# Patient Record
Sex: MALE | Race: WHITE | NOT HISPANIC OR LATINO
[De-identification: names, ages, dates, MRNs, and addresses within clinical notes are randomized per-mention and may not be internally consistent; named-entity substitution may affect disease eponyms.]

---

## 2022-10-24 PROBLEM — Z00.00 ENCOUNTER FOR PREVENTIVE HEALTH EXAMINATION: Status: ACTIVE | Noted: 2022-10-24

## 2022-11-29 ENCOUNTER — APPOINTMENT (OUTPATIENT)
Dept: ORTHOPEDIC SURGERY | Facility: CLINIC | Age: 42
End: 2022-11-29

## 2022-11-29 VITALS — BODY MASS INDEX: 26.79 KG/M2 | HEIGHT: 76 IN | WEIGHT: 220 LBS

## 2022-11-29 DIAGNOSIS — Z78.9 OTHER SPECIFIED HEALTH STATUS: ICD-10-CM

## 2022-11-29 PROCEDURE — 73564 X-RAY EXAM KNEE 4 OR MORE: CPT | Mod: LT

## 2022-11-29 PROCEDURE — 99204 OFFICE O/P NEW MOD 45 MIN: CPT | Mod: 25

## 2022-11-29 PROCEDURE — J3490M: CUSTOM

## 2022-11-29 PROCEDURE — 20610 DRAIN/INJ JOINT/BURSA W/O US: CPT

## 2022-11-29 NOTE — PHYSICAL EXAM
[Left] : left knee [NL (140)] : flexion 140 degrees [NL (0)] : extension 0 degrees [5___] : hamstring 5[unfilled]/5 [] : minimal effusion

## 2022-11-29 NOTE — PROCEDURE
[de-identified] : Procedure Name: Large Joint Injection / Aspiration: Depomedrol and Marcaine\par Anesthesia: ethyl chloride sprayed topically.. \par Depomedrol: An injection of Depomedrol 80 mg , 1 cc. \par Marcaine: 5 cc. \par Medication was injected in the left knee. Patient has tried OTC's including aspirin, Ibuprofen, Aleve etc or prescription\par NSAIDS, and/or exercises at home and/ or physical therapy without satisfactory response. After verbal consent using sterile preparation and technique. The risks, benefits, and alternatives to cortisone injection were explained in full to the patient. Risks outlined include but are not limited to infection, sepsis, bleeding, scarring, skin discoloration, temporary  increase in pain, syncopal episode, failure to resolve symptoms, allergic reaction, symptom recurrence, and elevation of blood sugar in diabetics. Patient understood the risks. All questions were answered. After discussion of options, patient requested an injection. Oral informed consent was obtained and sterile prep was done of the injection\par site. Sterile technique was utilized for the procedure including the preparation of the solutions used for the injection. Patient tolerated the procedure well. Advised to ice the injection site this evening. Prep with alcohol locally to site. Sterile technique used. Post Procedure Instructions: Patient was advised to call if redness, pain, or fever occur and\par apply ice for 15 min. out of every hour for the next 12-24 hours as tolerated.

## 2022-11-29 NOTE — ASSESSMENT
[FreeTextEntry1] : 42 year M WITH MODERATE LT KNEE PAIN FOR YEARS WITHOUT INJURY. HAS HAD CSI YEARS AGO. HAS TRIED PT WITH NO RELIEF. PAIN WORSENS WITH STAIRS AND WALKING PROLONGED DISTANCES. PAIN IS AFFECTING ADL AND FUNCTIONAL ACTIVITIES. XRAYS REVIEWED WITH ADVANCED LATERAL OA. TREATMENT OPTIONS REVIEWED. \par \par LT KNEE CSI TODAY. PATIENT TOLERATED INJECTION WELL. \par WILL AUTH FOR LT KNEE EUFLEXXA. THIS INJECTION IS MEDICALLY NECESSARY DUE TO SEVERE PAIN AND OA.

## 2022-11-29 NOTE — IMAGING
[Left] : left knee [All Views] : anteroposterior, lateral, skyline, and anteroposterior standing [Moderate tricompartmental OA lateral narrowing] : Moderate tricompartmental OA lateral narrowing [advanced tricompartmental OA with lateral compartment narrowing and valgus alignment] : advanced tricompartmental OA with lateral compartment narrowing and valgus alignment [Advanced patellofemoral OA] : Advanced patellofemoral OA

## 2022-12-27 ENCOUNTER — APPOINTMENT (OUTPATIENT)
Dept: ORTHOPEDIC SURGERY | Facility: CLINIC | Age: 42
End: 2022-12-27

## 2022-12-27 PROCEDURE — 99214 OFFICE O/P EST MOD 30 MIN: CPT | Mod: 25

## 2022-12-27 PROCEDURE — 20610 DRAIN/INJ JOINT/BURSA W/O US: CPT | Mod: LT

## 2022-12-27 NOTE — PROCEDURE
[de-identified] : Procedure Name: Euflexxa (Large Joint)\par \par Viscosupplementation Injection: X-ray evidence of Osteoarthritis on this or prior visit, Patient has tried OTC's including aspirin, Ibuprofen, Aleve etc or prescription NSAIDS, and/or exercises at home and/ or physical therapy without satisfactory response and Repeat series performed because patient had significant improvement in their pain and functional capacity from prior series which was given more than six months ago. \par \par An injection of Euflexxa 2ml #1 was injected into the left knee(s). after verbal consent using sterile technique. The risks, benefits, and alternatives to Viscosupplementation injection were explained in full to the patient. Risks outlined include but are not limited to infection, sepsis, bleeding, scarring, skin discoloration, temporary increase in pain, syncopal episode, failure to resolve symptoms, allergic reaction, and symptom recurrence. Signs and symptoms of infection reviewed and patient advised to call immediately for redness, fevers, and/or chills. Patient understood the risks. All questions were answered. After discussion of options, patient requested Viscosupplementation. The patient tolerated the procedure well. Ice tonight to the injection site. \par

## 2022-12-27 NOTE — PHYSICAL EXAM
[Left] : left knee [NL (140)] : flexion 140 degrees [NL (0)] : extension 0 degrees [5___] : hamstring 5[unfilled]/5 [] : no erythema

## 2022-12-27 NOTE — ASSESSMENT
[FreeTextEntry1] : 42 year M WITH MODERATE LT KNEE PAIN FOR YEARS WITHOUT INJURY. HAS HAD CSI YEARS AGO. HAS TRIED PT WITH NO RELIEF. PAIN WORSENS WITH STAIRS AND WALKING PROLONGED DISTANCES. PAIN IS AFFECTING ADL AND FUNCTIONAL ACTIVITIES. XRAYS REVIEWED WITH ADVANCED LATERAL OA. TREATMENT OPTIONS REVIEWED. \par \par LT KNEE EUFLEXXA #1 TODAY. PATIENT TOLERATED INJECTION WELL.

## 2022-12-27 NOTE — HISTORY OF PRESENT ILLNESS
[Gradual] : gradual [6] : 6 [Localized] : localized [Constant] : constant [Nothing helps with pain getting better] : Nothing helps with pain getting better [Walking] : walking [de-identified] : 11/29/22 HERE WITH LEFT KNEE PAIN FOR ABOUT 1 YEAR\par NO INJURY\par PT HAS TRY PHYSICAL THERAPY WITH NO RELIEF \par \par 12/27/22 here for a follow up on the left knee , pt had a cortisone injection last visit with some relief will start left knee euflexxa # 1 today  [] : no [FreeTextEntry1] : LEFT KNEE  [de-identified] : PHYSICAL THERAPY \par cortisone injection

## 2023-01-03 ENCOUNTER — APPOINTMENT (OUTPATIENT)
Dept: ORTHOPEDIC SURGERY | Facility: CLINIC | Age: 43
End: 2023-01-03
Payer: COMMERCIAL

## 2023-01-03 PROCEDURE — 20610 DRAIN/INJ JOINT/BURSA W/O US: CPT | Mod: LT

## 2023-01-03 PROCEDURE — 99212 OFFICE O/P EST SF 10 MIN: CPT | Mod: 25

## 2023-01-03 NOTE — ASSESSMENT
[FreeTextEntry1] : 42 year M WITH MODERATE LT KNEE PAIN FOR YEARS WITHOUT INJURY. HAS HAD CSI YEARS AGO. HAS TRIED PT WITH NO RELIEF. PAIN WORSENS WITH STAIRS AND WALKING PROLONGED DISTANCES. PAIN IS AFFECTING ADL AND FUNCTIONAL ACTIVITIES. XRAYS REVIEWED WITH ADVANCED LATERAL OA. TREATMENT OPTIONS REVIEWED. \par \par LT KNEE EUFLEXXA #2 TODAY. PATIENT TOLERATED INJECTION WELL.

## 2023-01-03 NOTE — HISTORY OF PRESENT ILLNESS
[Gradual] : gradual [6] : 6 [Localized] : localized [Constant] : constant [Nothing helps with pain getting better] : Nothing helps with pain getting better [Walking] : walking [de-identified] : 11/29/22 HERE WITH LEFT KNEE PAIN FOR ABOUT 1 YEAR\par NO INJURY\par PT HAS TRY PHYSICAL THERAPY WITH NO RELIEF \par \par 12/27/22 here for a follow up on the left knee , pt had a cortisone injection last visit with some relief will start left knee euflexxa # 1 today \par \par 01/03/22 left knee euflexxa # 2  [] : no [FreeTextEntry1] : LEFT KNEE  [de-identified] : PHYSICAL THERAPY \par cortisone injection

## 2023-01-03 NOTE — PROCEDURE
[de-identified] : Procedure Name: Euflexxa (Large Joint)\par \par Viscosupplementation Injection: X-ray evidence of Osteoarthritis on this or prior visit, Patient has tried OTC's including aspirin, Ibuprofen, Aleve etc or prescription NSAIDS, and/or exercises at home and/ or physical therapy without satisfactory response and Repeat series performed because patient had significant improvement in their pain and functional capacity from prior series which was given more than six months ago. \par \par An injection of Euflexxa 2ml #2 was injected into the left knee(s). after verbal consent using sterile technique. The risks, benefits, and alternatives to Viscosupplementation injection were explained in full to the patient. Risks outlined include but are not limited to infection, sepsis, bleeding, scarring, skin discoloration, temporary increase in pain, syncopal episode, failure to resolve symptoms, allergic reaction, and symptom recurrence. Signs and symptoms of infection reviewed and patient advised to call immediately for redness, fevers, and/or chills. Patient understood the risks. All questions were answered. After discussion of options, patient requested Viscosupplementation. The patient tolerated the procedure well. Ice tonight to the injection site. \par

## 2023-01-10 ENCOUNTER — APPOINTMENT (OUTPATIENT)
Dept: ORTHOPEDIC SURGERY | Facility: CLINIC | Age: 43
End: 2023-01-10
Payer: COMMERCIAL

## 2023-01-10 DIAGNOSIS — M17.12 UNILATERAL PRIMARY OSTEOARTHRITIS, LEFT KNEE: ICD-10-CM

## 2023-01-10 PROCEDURE — 99214 OFFICE O/P EST MOD 30 MIN: CPT | Mod: 25

## 2023-01-10 PROCEDURE — 20610 DRAIN/INJ JOINT/BURSA W/O US: CPT

## 2023-01-10 NOTE — PROCEDURE
[de-identified] : Procedure Name: Euflexxa (Large Joint)\par \par Viscosupplementation Injection: X-ray evidence of Osteoarthritis on this or prior visit, Patient has tried OTC's including aspirin, Ibuprofen, Aleve etc or prescription NSAIDS, and/or exercises at home and/ or physical therapy without satisfactory response and Repeat series performed because patient had significant improvement in their pain and functional capacity from prior series which was given more than six months ago. \par \par An injection of Euflexxa 2ml #3 was injected into the left knee(s). after verbal consent using sterile technique. The risks, benefits, and alternatives to Viscosupplementation injection were explained in full to the patient. Risks outlined include but are not limited to infection, sepsis, bleeding, scarring, skin discoloration, temporary increase in pain, syncopal episode, failure to resolve symptoms, allergic reaction, and symptom recurrence. Signs and symptoms of infection reviewed and patient advised to call immediately for redness, fevers, and/or chills. Patient understood the risks. All questions were answered. After discussion of options, patient requested Viscosupplementation. The patient tolerated the procedure well. Ice tonight to the injection site. \par

## 2023-01-10 NOTE — ASSESSMENT
[FreeTextEntry1] : 42 year M WITH MODERATE LT KNEE PAIN FOR YEARS WITHOUT INJURY. HAS HAD CSI YEARS AGO. HAS TRIED PT WITH NO RELIEF. PAIN WORSENS WITH STAIRS AND WALKING PROLONGED DISTANCES. PAIN IS AFFECTING ADL AND FUNCTIONAL ACTIVITIES. XRAYS REVIEWED WITH ADVANCED LATERAL OA. TREATMENT OPTIONS REVIEWED. \par \par LT KNEE EUFLEXXA #3 TODAY. PATIENT TOLERATED INJECTION WELL.

## 2023-05-17 ENCOUNTER — OUTPATIENT (OUTPATIENT)
Dept: OUTPATIENT SERVICES | Facility: HOSPITAL | Age: 43
LOS: 1 days | End: 2023-05-17

## 2023-05-17 VITALS
TEMPERATURE: 98 F | WEIGHT: 225.97 LBS | HEIGHT: 75.5 IN | RESPIRATION RATE: 16 BRPM | HEART RATE: 72 BPM | SYSTOLIC BLOOD PRESSURE: 122 MMHG | OXYGEN SATURATION: 98 % | DIASTOLIC BLOOD PRESSURE: 78 MMHG

## 2023-05-17 DIAGNOSIS — M26.02 MAXILLARY HYPOPLASIA: ICD-10-CM

## 2023-05-17 DIAGNOSIS — Z98.890 OTHER SPECIFIED POSTPROCEDURAL STATES: Chronic | ICD-10-CM

## 2023-05-17 DIAGNOSIS — M26.03 MANDIBULAR HYPERPLASIA: ICD-10-CM

## 2023-05-17 LAB
ANION GAP SERPL CALC-SCNC: 13 MMOL/L — SIGNIFICANT CHANGE UP (ref 7–14)
BLD GP AB SCN SERPL QL: NEGATIVE — SIGNIFICANT CHANGE UP
BUN SERPL-MCNC: 28 MG/DL — HIGH (ref 7–23)
CALCIUM SERPL-MCNC: 9.6 MG/DL — SIGNIFICANT CHANGE UP (ref 8.4–10.5)
CHLORIDE SERPL-SCNC: 101 MMOL/L — SIGNIFICANT CHANGE UP (ref 98–107)
CO2 SERPL-SCNC: 22 MMOL/L — SIGNIFICANT CHANGE UP (ref 22–31)
CREAT SERPL-MCNC: 0.88 MG/DL — SIGNIFICANT CHANGE UP (ref 0.5–1.3)
EGFR: 110 ML/MIN/1.73M2 — SIGNIFICANT CHANGE UP
GLUCOSE SERPL-MCNC: 76 MG/DL — SIGNIFICANT CHANGE UP (ref 70–99)
HCT VFR BLD CALC: 43.2 % — SIGNIFICANT CHANGE UP (ref 39–50)
HGB BLD-MCNC: 14.9 G/DL — SIGNIFICANT CHANGE UP (ref 13–17)
MCHC RBC-ENTMCNC: 29.3 PG — SIGNIFICANT CHANGE UP (ref 27–34)
MCHC RBC-ENTMCNC: 34.5 GM/DL — SIGNIFICANT CHANGE UP (ref 32–36)
MCV RBC AUTO: 84.9 FL — SIGNIFICANT CHANGE UP (ref 80–100)
NRBC # BLD: 0 /100 WBCS — SIGNIFICANT CHANGE UP (ref 0–0)
NRBC # FLD: 0 K/UL — SIGNIFICANT CHANGE UP (ref 0–0)
PLATELET # BLD AUTO: 289 K/UL — SIGNIFICANT CHANGE UP (ref 150–400)
POTASSIUM SERPL-MCNC: 3.7 MMOL/L — SIGNIFICANT CHANGE UP (ref 3.5–5.3)
POTASSIUM SERPL-SCNC: 3.7 MMOL/L — SIGNIFICANT CHANGE UP (ref 3.5–5.3)
RBC # BLD: 5.09 M/UL — SIGNIFICANT CHANGE UP (ref 4.2–5.8)
RBC # FLD: 12.5 % — SIGNIFICANT CHANGE UP (ref 10.3–14.5)
RH IG SCN BLD-IMP: POSITIVE — SIGNIFICANT CHANGE UP
SODIUM SERPL-SCNC: 136 MMOL/L — SIGNIFICANT CHANGE UP (ref 135–145)
WBC # BLD: 6.69 K/UL — SIGNIFICANT CHANGE UP (ref 3.8–10.5)
WBC # FLD AUTO: 6.69 K/UL — SIGNIFICANT CHANGE UP (ref 3.8–10.5)

## 2023-05-17 RX ORDER — SODIUM CHLORIDE 9 MG/ML
1000 INJECTION, SOLUTION INTRAVENOUS
Refills: 0 | Status: DISCONTINUED | OUTPATIENT
Start: 2023-05-24 | End: 2023-05-25

## 2023-05-17 NOTE — H&P PST ADULT - NSICDXPASTSURGICALHX_GEN_ALL_CORE_FT
PAST SURGICAL HISTORY:  H/O arthroscopy of knee     History of arthroscopic surgery of shoulder

## 2023-05-17 NOTE — H&P PST ADULT - ENMT COMMENTS
Pt c/o underbite since childhood days Pre op dx of mandibular hyperplasia Pt c/o underbite since childhood days .

## 2023-05-17 NOTE — H&P PST ADULT - HISTORY OF PRESENT ILLNESS
42 year old male with pre op dx of mandibular hyperplasia is scheduled for maxillary lefort 1 osteotomy , bilateral sagittal split osteotomies.  42 year old male with pre op dx of mandibular hyperplasia is scheduled for maxillary LeFort 1 osteotomy , bilateral sagittal split osteotomies.

## 2023-05-17 NOTE — H&P PST ADULT - ADDITIONAL PE
Maxillary 3  Denies dentures. Denies loose teeth. Mallampati - 3  Denies dentures. Denies loose teeth.

## 2023-05-17 NOTE — H&P PST ADULT - PROBLEM SELECTOR PLAN 1
Patient tentatively scheduled for maxillary LeFort 1 osteotomy , bilateral sagittal split osteotomies on 05/24/2023.    Pre-op instructions provided. Pt given verbal and written instructions with teach back onpepcid. Pt verbalized understanding with return demonstration.    Labs done. Patient tentatively scheduled for maxillary LeFort 1 osteotomy, bilateral sagittal split osteotomies on 05/24/2023.    Pre-op instructions provided. Pt given verbal and written instructions with teach back on pepcid. Pt verbalized understanding with return demonstration.    Labs done.

## 2023-05-17 NOTE — H&P PST ADULT - MUSCULOSKELETAL
ROM intact/normal gait/strength 5/5 bilateral upper extremities/strength 5/5 bilateral lower extremities/extremities exam ROM intact/no calf tenderness/normal gait/strength 5/5 bilateral upper extremities/strength 5/5 bilateral lower extremities/extremities exam negative

## 2023-05-23 ENCOUNTER — TRANSCRIPTION ENCOUNTER (OUTPATIENT)
Age: 43
End: 2023-05-23

## 2023-05-24 ENCOUNTER — INPATIENT (INPATIENT)
Facility: HOSPITAL | Age: 43
LOS: 0 days | Discharge: HOME | End: 2023-05-25
Attending: ORAL & MAXILLOFACIAL SURGERY | Admitting: ORAL & MAXILLOFACIAL SURGERY

## 2023-05-24 VITALS
HEIGHT: 75.5 IN | WEIGHT: 225.97 LBS | TEMPERATURE: 98 F | SYSTOLIC BLOOD PRESSURE: 116 MMHG | RESPIRATION RATE: 16 BRPM | OXYGEN SATURATION: 96 % | HEART RATE: 72 BPM | DIASTOLIC BLOOD PRESSURE: 71 MMHG

## 2023-05-24 DIAGNOSIS — M26.02 MAXILLARY HYPOPLASIA: ICD-10-CM

## 2023-05-24 DIAGNOSIS — Z98.890 OTHER SPECIFIED POSTPROCEDURAL STATES: Chronic | ICD-10-CM

## 2023-05-24 LAB
GAS PNL BLDA: SIGNIFICANT CHANGE UP
GAS PNL BLDA: SIGNIFICANT CHANGE UP
HCT VFR BLD CALC: 34.4 % — LOW (ref 39–50)
HGB BLD-MCNC: 12.1 G/DL — LOW (ref 13–17)
MCHC RBC-ENTMCNC: 28.9 PG — SIGNIFICANT CHANGE UP (ref 27–34)
MCHC RBC-ENTMCNC: 35.2 GM/DL — SIGNIFICANT CHANGE UP (ref 32–36)
MCV RBC AUTO: 82.1 FL — SIGNIFICANT CHANGE UP (ref 80–100)
NRBC # BLD: 0 /100 WBCS — SIGNIFICANT CHANGE UP (ref 0–0)
NRBC # FLD: 0 K/UL — SIGNIFICANT CHANGE UP (ref 0–0)
PLATELET # BLD AUTO: 258 K/UL — SIGNIFICANT CHANGE UP (ref 150–400)
RBC # BLD: 4.19 M/UL — LOW (ref 4.2–5.8)
RBC # FLD: 12.6 % — SIGNIFICANT CHANGE UP (ref 10.3–14.5)
WBC # BLD: 20.91 K/UL — HIGH (ref 3.8–10.5)
WBC # FLD AUTO: 20.91 K/UL — HIGH (ref 3.8–10.5)

## 2023-05-24 DEVICE — AVITENE: Type: IMPLANTABLE DEVICE | Status: FUNCTIONAL

## 2023-05-24 DEVICE — PLATE 2 ORTHOG FACIAL ID: Type: IMPLANTABLE DEVICE | Status: FUNCTIONAL

## 2023-05-24 DEVICE — SCREW AXS SELF TAP 1.7X6MM: Type: IMPLANTABLE DEVICE | Status: FUNCTIONAL

## 2023-05-24 DEVICE — SCREW AXS SELF TAP 1.7X4MM MUST ORDER IN MULTIPLES OF 5: Type: IMPLANTABLE DEVICE | Status: FUNCTIONAL

## 2023-05-24 DEVICE — IMP VSP MODELING: Type: IMPLANTABLE DEVICE | Status: FUNCTIONAL

## 2023-05-24 DEVICE — SCREW AXS SELF TAP CRS 1.9X5MM MUST ORDER IN MULTIPLES OF 5: Type: IMPLANTABLE DEVICE | Status: FUNCTIONAL

## 2023-05-24 DEVICE — GUIDE VSP ORTHOG TITANIUM: Type: IMPLANTABLE DEVICE | Status: FUNCTIONAL

## 2023-05-24 RX ORDER — SODIUM CHLORIDE 9 MG/ML
500 INJECTION, SOLUTION INTRAVENOUS ONCE
Refills: 0 | Status: COMPLETED | OUTPATIENT
Start: 2023-05-24 | End: 2023-05-24

## 2023-05-24 RX ORDER — CHLORHEXIDINE GLUCONATE 213 G/1000ML
15 SOLUTION TOPICAL
Refills: 0 | Status: DISCONTINUED | OUTPATIENT
Start: 2023-05-24 | End: 2023-05-25

## 2023-05-24 RX ORDER — SODIUM CHLORIDE 0.65 %
1 AEROSOL, SPRAY (ML) NASAL
Refills: 0 | Status: DISCONTINUED | OUTPATIENT
Start: 2023-05-24 | End: 2023-05-25

## 2023-05-24 RX ORDER — HYDROMORPHONE HYDROCHLORIDE 2 MG/ML
0.5 INJECTION INTRAMUSCULAR; INTRAVENOUS; SUBCUTANEOUS
Refills: 0 | Status: DISCONTINUED | OUTPATIENT
Start: 2023-05-24 | End: 2023-05-25

## 2023-05-24 RX ORDER — ONDANSETRON 8 MG/1
4 TABLET, FILM COATED ORAL ONCE
Refills: 0 | Status: COMPLETED | OUTPATIENT
Start: 2023-05-24 | End: 2023-05-24

## 2023-05-24 RX ORDER — ACETAMINOPHEN 500 MG
650 TABLET ORAL EVERY 6 HOURS
Refills: 0 | Status: DISCONTINUED | OUTPATIENT
Start: 2023-05-24 | End: 2023-05-25

## 2023-05-24 RX ORDER — ONDANSETRON 8 MG/1
4 TABLET, FILM COATED ORAL EVERY 8 HOURS
Refills: 0 | Status: DISCONTINUED | OUTPATIENT
Start: 2023-05-24 | End: 2023-05-25

## 2023-05-24 RX ORDER — OXYCODONE HYDROCHLORIDE 5 MG/1
5 TABLET ORAL ONCE
Refills: 0 | Status: DISCONTINUED | OUTPATIENT
Start: 2023-05-24 | End: 2023-05-25

## 2023-05-24 RX ORDER — MIDAZOLAM HYDROCHLORIDE 1 MG/ML
2 INJECTION, SOLUTION INTRAMUSCULAR; INTRAVENOUS ONCE
Refills: 0 | Status: DISCONTINUED | OUTPATIENT
Start: 2023-05-24 | End: 2023-05-24

## 2023-05-24 RX ORDER — SODIUM CHLORIDE 9 MG/ML
1000 INJECTION, SOLUTION INTRAVENOUS
Refills: 0 | Status: DISCONTINUED | OUTPATIENT
Start: 2023-05-24 | End: 2023-05-25

## 2023-05-24 RX ORDER — METOCLOPRAMIDE HCL 10 MG
8 TABLET ORAL ONCE
Refills: 0 | Status: COMPLETED | OUTPATIENT
Start: 2023-05-24 | End: 2023-05-24

## 2023-05-24 RX ORDER — SODIUM CHLORIDE 9 MG/ML
1000 INJECTION, SOLUTION INTRAVENOUS ONCE
Refills: 0 | Status: COMPLETED | OUTPATIENT
Start: 2023-05-24 | End: 2023-05-24

## 2023-05-24 RX ORDER — ACETAMINOPHEN 500 MG
1000 TABLET ORAL ONCE
Refills: 0 | Status: COMPLETED | OUTPATIENT
Start: 2023-05-24 | End: 2023-05-24

## 2023-05-24 RX ORDER — FLUTICASONE PROPIONATE 50 MCG
1 SPRAY, SUSPENSION NASAL
Refills: 0 | Status: DISCONTINUED | OUTPATIENT
Start: 2023-05-24 | End: 2023-05-25

## 2023-05-24 RX ORDER — OXYCODONE HYDROCHLORIDE 5 MG/1
5 TABLET ORAL EVERY 4 HOURS
Refills: 0 | Status: DISCONTINUED | OUTPATIENT
Start: 2023-05-24 | End: 2023-05-25

## 2023-05-24 RX ORDER — OXYCODONE HYDROCHLORIDE 5 MG/1
5 TABLET ORAL ONCE
Refills: 0 | Status: DISCONTINUED | OUTPATIENT
Start: 2023-05-24 | End: 2023-05-24

## 2023-05-24 RX ORDER — FENTANYL CITRATE 50 UG/ML
25 INJECTION INTRAVENOUS
Refills: 0 | Status: DISCONTINUED | OUTPATIENT
Start: 2023-05-24 | End: 2023-05-25

## 2023-05-24 RX ORDER — PENICILLIN V POTASSIUM 250 MG
2 TABLET ORAL EVERY 4 HOURS
Refills: 0 | Status: DISCONTINUED | OUTPATIENT
Start: 2023-05-24 | End: 2023-05-25

## 2023-05-24 RX ORDER — IBUPROFEN 200 MG
600 TABLET ORAL EVERY 6 HOURS
Refills: 0 | Status: DISCONTINUED | OUTPATIENT
Start: 2023-05-24 | End: 2023-05-25

## 2023-05-24 RX ORDER — OXYCODONE HYDROCHLORIDE 5 MG/1
10 TABLET ORAL EVERY 4 HOURS
Refills: 0 | Status: DISCONTINUED | OUTPATIENT
Start: 2023-05-24 | End: 2023-05-25

## 2023-05-24 RX ORDER — OXYCODONE HYDROCHLORIDE 5 MG/1
10 TABLET ORAL ONCE
Refills: 0 | Status: DISCONTINUED | OUTPATIENT
Start: 2023-05-24 | End: 2023-05-24

## 2023-05-24 RX ORDER — MORPHINE SULFATE 50 MG/1
2 CAPSULE, EXTENDED RELEASE ORAL ONCE
Refills: 0 | Status: DISCONTINUED | OUTPATIENT
Start: 2023-05-24 | End: 2023-05-25

## 2023-05-24 RX ORDER — OXYCODONE HYDROCHLORIDE 5 MG/1
10 TABLET ORAL ONCE
Refills: 0 | Status: DISCONTINUED | OUTPATIENT
Start: 2023-05-24 | End: 2023-05-25

## 2023-05-24 RX ORDER — OXYMETAZOLINE HYDROCHLORIDE 0.5 MG/ML
2 SPRAY NASAL
Refills: 0 | Status: DISCONTINUED | OUTPATIENT
Start: 2023-05-24 | End: 2023-05-25

## 2023-05-24 RX ORDER — FENTANYL CITRATE 50 UG/ML
50 INJECTION INTRAVENOUS
Refills: 0 | Status: DISCONTINUED | OUTPATIENT
Start: 2023-05-24 | End: 2023-05-24

## 2023-05-24 RX ADMIN — Medication 8 MILLIGRAM(S): at 20:53

## 2023-05-24 RX ADMIN — Medication 100 MILLION UNIT(S): at 19:23

## 2023-05-24 RX ADMIN — FENTANYL CITRATE 50 MICROGRAM(S): 50 INJECTION INTRAVENOUS at 18:45

## 2023-05-24 RX ADMIN — ONDANSETRON 4 MILLIGRAM(S): 8 TABLET, FILM COATED ORAL at 20:23

## 2023-05-24 RX ADMIN — Medication 1000 MILLIGRAM(S): at 19:15

## 2023-05-24 RX ADMIN — MIDAZOLAM HYDROCHLORIDE 2 MILLIGRAM(S): 1 INJECTION, SOLUTION INTRAMUSCULAR; INTRAVENOUS at 22:20

## 2023-05-24 RX ADMIN — SODIUM CHLORIDE 1000 MILLILITER(S): 9 INJECTION, SOLUTION INTRAVENOUS at 22:01

## 2023-05-24 RX ADMIN — FENTANYL CITRATE 50 MICROGRAM(S): 50 INJECTION INTRAVENOUS at 19:00

## 2023-05-24 RX ADMIN — Medication 400 MILLIGRAM(S): at 18:45

## 2023-05-24 RX ADMIN — FENTANYL CITRATE 50 MICROGRAM(S): 50 INJECTION INTRAVENOUS at 18:00

## 2023-05-24 RX ADMIN — OXYMETAZOLINE HYDROCHLORIDE 2 SPRAY(S): 0.5 SPRAY NASAL at 18:45

## 2023-05-24 RX ADMIN — HYDROMORPHONE HYDROCHLORIDE 0.5 MILLIGRAM(S): 2 INJECTION INTRAMUSCULAR; INTRAVENOUS; SUBCUTANEOUS at 20:45

## 2023-05-24 RX ADMIN — HYDROMORPHONE HYDROCHLORIDE 0.5 MILLIGRAM(S): 2 INJECTION INTRAMUSCULAR; INTRAVENOUS; SUBCUTANEOUS at 21:15

## 2023-05-24 RX ADMIN — FENTANYL CITRATE 50 MICROGRAM(S): 50 INJECTION INTRAVENOUS at 18:12

## 2023-05-24 RX ADMIN — SODIUM CHLORIDE 1000 MILLILITER(S): 9 INJECTION, SOLUTION INTRAVENOUS at 20:55

## 2023-05-24 NOTE — H&P ADULT - NSHPPHYSICALEXAM_GEN_ALL_CORE
CONSTITUTIONAL: Well groomed, no apparent distress    EYES: PERRL and symmetric, EOMI, No conjunctival or scleral injection, non-icteric    ENMT:   ears: canals clear, no signs of hearing discrepancies  nose: nares clear, no rhinorrhea noted  IOE: adult dentition without signs of decay. no edema, erythema, or active infection. FOM soft, glands productive, uvula midline.              NECK: Supple, symmetric and without tracheal deviation     RESP: No respiratory distress, no use of accessory muscles; CTA b/l    CV: RRR, no JVD; no peripheral edema    GI: Soft, NT, ND, no rebound, no guarding; no palpable masses; no hepatosplenomegaly    LYMPH: No cervical LAD or tenderness; no axillary LAD or tenderness    MSK: Normal gait; No digital clubbing or cyanosis; examination of the head/neck without misalignment,            Normal ROM without pain, no spinal tenderness, normal muscle strength/tone    SKIN: No rashes or ulcers noted; no subcutaneous nodules or induration palpable    NEURO: CN II-XII intact; normal reflexes in upper, sensation intact in upper and lower extremities b/l to light touch     PSYCH: Appropriate insight/judgment; A+O x 3, mood and affect appropriate, recent/remote memory intact

## 2023-05-24 NOTE — H&P ADULT - NSHPREVIEWOFSYSTEMS_GEN_ALL_CORE
REVIEW OF SYSTEMS    General:	aaox3, well-appearing    Skin: negative  	  Ophthalmologic: negative  	  ENMT:	dentofacial deformity    Respiratory and Thorax: negative  	  Cardiovascular:	negative    Gastrointestinal:	negative    Genitourinary:	negative    Musculoskeletal:	negative    Neurological:	negative    Psychiatric:	negative    Hematology/Lymphatics:	negative    Endocrine: negative    Allergic/Immunologic:	negative

## 2023-05-24 NOTE — CHART NOTE - NSCHARTNOTEFT_GEN_A_CORE
42 S/P Lefort Osteotomy. OMFS paged for oozing from bilateral nares unresponsive to Afrin and pressure. NGT output has been minimal for the last 3 hours.  Pt evaluated bedside after rhinorocket was placed by PACU/ anesthesia team.  Appears hemostatic.  Pt denies SOB, denies HA, denies dizziness/visual changes.    ICU Vital Signs Last 24 Hrs  T(C): 37 (24 May 2023 22:00), Max: 37 (24 May 2023 22:00)  T(F): 98.6 (24 May 2023 22:00), Max: 98.6 (24 May 2023 22:00)  HR: 102 (24 May 2023 22:45) (68 - 112)  BP: 130/66 (24 May 2023 22:45) (101/73 - 152/92)  BP(mean): 78 (24 May 2023 22:45) (65 - 104)  ABP: 122/62 (24 May 2023 22:45) (114/58 - 172/92)  ABP(mean): 80 (24 May 2023 22:45) (76 - 209)  RR: 11 (24 May 2023 22:45) (8 - 18)  SpO2: 93% (24 May 2023 22:45) (93% - 100%)    O2 Parameters below as of 24 May 2023 18:15  Patient On (Oxygen Delivery Method): face tent  O2 Flow (L/min): 6    AAOX3: In no acute distress. Pt is speaking demonstrating patency of airway.  Face: Normal post operative swelling. Rhinorocket in Right nare, hemostatic. NGT in place.   Oral: SS in the mouth. Incision are intact. Gingiva is warm, and appears well perfused. Elastics are in place.                           12.1   20.91 )-----------( 258      ( 24 May 2023 21:59 )             34.4       A/P:  42 s/p lefort with mild oozing from nares now hemostatic after placement of rhinorocket. Of note pt had difficult intubation , x3 attempts due to tearing of cuff while passing the nasotracheal tube. Oozing is most likely attributed to nasal trauma from intubation    - Stable repeat CBC consistent with operative EBL.  - No concern for arterial bleed at this time.   - Once PACU team feels comfortable, pt may be transitioned to the floor.  Please remove crowell and A-line.  - OMFS will remove Rhinorocket in the AM.  - IVF.   - NGT to low continuous suction.     OMFS [id number]

## 2023-05-24 NOTE — H&P ADULT - HISTORY OF PRESENT ILLNESS
42 yr old male well known to Dr. Duffy with a PMH of a dentofacial deformity presenting to [facility] for a LeFort 1 osteotomy and Bilateral sagittal split osteotomy in the OR today 5/24 with Dr. Duffy.

## 2023-05-24 NOTE — ASU PREOP CHECKLIST - AS BP NONINV SITE
left upper arm Closure 4 Information: This tab is for additional flaps and grafts above and beyond our usual structured repairs.  Please note if you enter information here it will not currently bill and you will need to add the billing information manually.

## 2023-05-24 NOTE — H&P ADULT - ASSESSMENT
42 yr old male well known to Dr. Duffy with a PMH of a dentofacial deformity presenting to [facility] for a LeFort 1 osteotomy and Bilateral sagittal split osteotomy in the OR today 5/24 with Dr. Duffy. no contraindications or barriers

## 2023-05-25 ENCOUNTER — TRANSCRIPTION ENCOUNTER (OUTPATIENT)
Age: 43
End: 2023-05-25

## 2023-05-25 VITALS
RESPIRATION RATE: 18 BRPM | SYSTOLIC BLOOD PRESSURE: 120 MMHG | TEMPERATURE: 98 F | OXYGEN SATURATION: 96 % | HEART RATE: 90 BPM | DIASTOLIC BLOOD PRESSURE: 80 MMHG

## 2023-05-25 RX ORDER — ALPRAZOLAM 0.25 MG
0.5 TABLET ORAL ONCE
Refills: 0 | Status: DISCONTINUED | OUTPATIENT
Start: 2023-05-25 | End: 2023-05-25

## 2023-05-25 RX ORDER — FLUOXETINE HCL 10 MG
40 CAPSULE ORAL DAILY
Refills: 0 | Status: DISCONTINUED | OUTPATIENT
Start: 2023-05-25 | End: 2023-05-25

## 2023-05-25 RX ORDER — SODIUM CHLORIDE 9 MG/ML
1000 INJECTION, SOLUTION INTRAVENOUS
Refills: 0 | Status: DISCONTINUED | OUTPATIENT
Start: 2023-05-25 | End: 2023-05-25

## 2023-05-25 RX ADMIN — Medication 600 MILLIGRAM(S): at 16:21

## 2023-05-25 RX ADMIN — Medication 650 MILLIGRAM(S): at 07:05

## 2023-05-25 RX ADMIN — Medication 100 MILLION UNIT(S): at 00:57

## 2023-05-25 RX ADMIN — Medication 0.5 MILLIGRAM(S): at 10:44

## 2023-05-25 RX ADMIN — Medication 1 SPRAY(S): at 16:07

## 2023-05-25 RX ADMIN — Medication 650 MILLIGRAM(S): at 12:16

## 2023-05-25 RX ADMIN — Medication 1 SPRAY(S): at 07:25

## 2023-05-25 RX ADMIN — Medication 600 MILLIGRAM(S): at 15:51

## 2023-05-25 RX ADMIN — OXYCODONE HYDROCHLORIDE 10 MILLIGRAM(S): 5 TABLET ORAL at 03:54

## 2023-05-25 RX ADMIN — Medication 600 MILLIGRAM(S): at 03:58

## 2023-05-25 RX ADMIN — Medication 650 MILLIGRAM(S): at 17:35

## 2023-05-25 RX ADMIN — Medication 40 MILLIGRAM(S): at 11:52

## 2023-05-25 RX ADMIN — Medication 100 MILLION UNIT(S): at 12:38

## 2023-05-25 RX ADMIN — OXYMETAZOLINE HYDROCHLORIDE 2 SPRAY(S): 0.5 SPRAY NASAL at 05:55

## 2023-05-25 RX ADMIN — OXYMETAZOLINE HYDROCHLORIDE 2 SPRAY(S): 0.5 SPRAY NASAL at 17:37

## 2023-05-25 RX ADMIN — Medication 100 MILLION UNIT(S): at 05:55

## 2023-05-25 RX ADMIN — Medication 100 MILLION UNIT(S): at 09:44

## 2023-05-25 RX ADMIN — CHLORHEXIDINE GLUCONATE 15 MILLILITER(S): 213 SOLUTION TOPICAL at 05:55

## 2023-05-25 RX ADMIN — Medication 650 MILLIGRAM(S): at 18:05

## 2023-05-25 RX ADMIN — Medication 650 MILLIGRAM(S): at 11:46

## 2023-05-25 RX ADMIN — Medication 600 MILLIGRAM(S): at 09:42

## 2023-05-25 RX ADMIN — Medication 600 MILLIGRAM(S): at 09:12

## 2023-05-25 NOTE — DISCHARGE NOTE PROVIDER - HOSPITAL COURSE
5/24/23 HD1   42 yr old male well known to Dr. Duffy with a PMH of a dentofacial deformity presenting to [facility] for a LeFort 1 osteotomy in the OR today 5/24 with Dr. Duffy.    5/25/23 HD2 POD1  patient is now 1 day s/p maxillary LeFort 1 osteotomy with Dr. Duffy in the OR without complication.     5/25/23  patient meets requirements for discharge from OMFS perspective

## 2023-05-25 NOTE — PATIENT PROFILE ADULT - FALL HARM RISK - HARM RISK INTERVENTIONS

## 2023-05-25 NOTE — DISCHARGE NOTE NURSING/CASE MANAGEMENT/SOCIAL WORK - PATIENT PORTAL LINK FT
You can access the FollowMyHealth Patient Portal offered by [facility] by registering at the following website: [email or web address]. By joining [facility]’s FollowMyHealth portal, you will also be able to view your health information using other applications (apps) compatible with our system.

## 2023-05-25 NOTE — DISCHARGE NOTE PROVIDER - CARE PROVIDER_API CALL
Leandro Duffy  Oral/Maxillofacial Surgery  [address] [place] [address], [address]  [place], [place] [id number]  Phone: [phone]  Fax: [phone]  Established Patient  Follow Up Time: 1 week

## 2023-05-25 NOTE — PROGRESS NOTE ADULT - ASSESSMENT
42yr old male pt well known to Dr. Duffy with a PMH of a dentofacial deformity s/p Maxillary LeFort 1 osteotomy with Dr. Duffy in the OR on 5/25/23. Patient progressing well with normal post operative course.    Plan:  -encourage drinking, voiding, and ambulating  -strict I&O  -advance to FLD  -consider DC later today    Alpesh Louis DDS  [facility] pager: [phone]   [place]: [phone]  [employer]valiable on teams

## 2023-05-25 NOTE — PROGRESS NOTE ADULT - SUBJECTIVE AND OBJECTIVE BOX
5/25/23 Hd2 POd1  patient is now 1 day s/p maxillary LeFort 1 osteotomy with Dr. Duffy in the OR without complication.     patient visited bedside. no acute events overnight. patient afebrile, vitals stable. patient is healing well appropriate for post-operative coruse. patient has been drinking, ambulating, and voiding. gingiva is pink and well-perfused. sutures are clean, closed, and in tact. maxillary and mandibular segments are stable. patient reports pain 4/10 to be continued to be managed with PRN pain medication. elastics are in place. no excess heme noted. NGT, a-line, and crowell removed. Jaw bra removed. encouraging voiding, drinking, and ambulating.     General: aaox3. well-appearing. no apparent distress.     HEENT:  Head: normocephalic  E: normal hearing  E: PERRL, no conjunctival hemmorage noted  N: nares clear, minimal heme consistent with post-operative bleeding  T: neck soft and supple, no sings of indurations. no LAD    Intraoral exam:  gingiva is pink and well-perfused. sutures are clean, closed, and in tact. maxillary and mandibular segments are stable. patient progressing well and healing appropriately.     T(C): 37 (05-25-23 @ 00:30), Max: 37 (05-24-23 @ 22:00)  HR: 101 (05-25-23 @ 00:30) (68 - 115)  BP: 113/79 (05-25-23 @ 00:30) (101/73 - 152/92)  RR: 18 (05-25-23 @ 00:30) (8 - 20)  SpO2: 95% (05-25-23 @ 00:30) (93% - 100%)      05-24-23 @ 07:01  -  05-25-23 @ 06:56  --------------------------------------------------------  IN: 260 mL / OUT: 750 mL / NET: -490 mL        acetaminophen   Oral Liquid .. 650 milliGRAM(s) Oral every 6 hours  chlorhexidine 0.12% Liquid 15 milliLiter(s) Oral Mucosa two times a day  fluticasone propionate 50 MICROgram(s)/spray Nasal Spray 1 Spray(s) Both Nostrils <User Schedule>  HYDROmorphone  Injectable 0.5 milliGRAM(s) IV Push every 10 minutes PRN  ibuprofen  Suspension. 600 milliGRAM(s) Oral every 6 hours  lactated ringers. 1000 milliLiter(s) IV Continuous <Continuous>  morphine  - Injectable 2 milliGRAM(s) IV Push once PRN  ondansetron Injectable 4 milliGRAM(s) IV Push every 8 hours PRN  oxyCODONE    Solution 5 milliGRAM(s) Oral once PRN  oxyCODONE    Solution 5 milliGRAM(s) Oral every 4 hours PRN  oxyCODONE    Solution 10 milliGRAM(s) Oral every 4 hours PRN  oxyCODONE    Solution 10 milliGRAM(s) Oral once PRN  oxymetazoline 0.05% Nasal Spray 2 Spray(s) Both Nostrils two times a day  penicillin   G  potassium  IVPB 2 Million Unit(s) IV Intermittent every 4 hours  sodium chloride 0.65% Nasal 1 Spray(s) Both Nostrils every 2 hours PRN  
Called to the bedside to evaluate nose bleed.     Postop for LeFort 1 Maxillectomy. Uncomplicated surgery. No significant nose bleeding post extubation and early into PACU stay. Around 9pm, nurse reported significant bleeding from both right and left. Afrin nasal spray instilled and pressure held but bleeding has continued. The patient denies any pain or difficulty breathing through the mouth. Patient reporting anxiety.     On exam, he is awake, alert and in no apparent distress. Respirations regular and non-labored. Breath sounds clear. Blood with small clots noted in the mouth. Large clot noted in the left nostril next to the NGT with mild oozing of blood around the clot. Right nostril with mild oozing.     /65   RR 13 SpO2 94% on room air     Assessment: Nose bleed. Anxiety.     Discussed with Dr. Duffy who is okay with placement of nasal rocket to pack the right nostril. Will pack the left nostril around the NGT with oxymetazoline soaked pledgets.      Midazolam ordered for anxiety.     Report provided to Overnight PACU attending.

## 2023-05-25 NOTE — DISCHARGE NOTE PROVIDER - NSDCMRMEDTOKEN_GEN_ALL_CORE_FT
acetaminophen 650 mg/20.3 mL oral liquid: 20 milliliter(s) orally every 6 hours  Afrin 0.05% nasal spray: 2 spray(s) in each nostril 2 times a day  amoxicillin-clavulanate 400 mg-57 mg/5 mL oral liquid: 10 milliliter(s) orally 2 times a day  chlorhexidine 0.12% mucous membrane liquid: 15 milliliter(s) orally 2 times a day  Fluoxetine HCL 40mg once a day:   fluticasone 50 mcg/inh nasal spray: 1 spray(s) in each nostril once a day  ibuprofen 100 mg/5 mL oral suspension: 30 milliliter(s) orally every 6 hours  oxyCODONE 5 mg/5 mL oral solution: 5 milliliter(s) orally every 6 hours as needed for  severe pain  Saline Mist 0.65% nasal spray: 2 spray(s) intranasally every 1 to 2 hours as needed for  congestion

## 2023-05-28 ENCOUNTER — TRANSCRIPTION ENCOUNTER (OUTPATIENT)
Age: 43
End: 2023-05-28

## 2023-10-06 PROBLEM — M26.03 MANDIBULAR HYPERPLASIA: Chronic | Status: ACTIVE | Noted: 2023-05-17

## 2023-10-11 ENCOUNTER — OUTPATIENT (OUTPATIENT)
Dept: OUTPATIENT SERVICES | Facility: HOSPITAL | Age: 43
LOS: 1 days | End: 2023-10-11

## 2023-10-11 VITALS
HEART RATE: 70 BPM | DIASTOLIC BLOOD PRESSURE: 85 MMHG | RESPIRATION RATE: 15 BRPM | OXYGEN SATURATION: 97 % | WEIGHT: 220.02 LBS | HEIGHT: 76 IN | SYSTOLIC BLOOD PRESSURE: 127 MMHG | TEMPERATURE: 98 F

## 2023-10-11 DIAGNOSIS — Z91.89 OTHER SPECIFIED PERSONAL RISK FACTORS, NOT ELSEWHERE CLASSIFIED: ICD-10-CM

## 2023-10-11 DIAGNOSIS — M26.03 MANDIBULAR HYPERPLASIA: ICD-10-CM

## 2023-10-11 DIAGNOSIS — Z98.890 OTHER SPECIFIED POSTPROCEDURAL STATES: Chronic | ICD-10-CM

## 2023-10-11 DIAGNOSIS — F41.9 ANXIETY DISORDER, UNSPECIFIED: ICD-10-CM

## 2023-10-11 DIAGNOSIS — M26.02 MAXILLARY HYPOPLASIA: ICD-10-CM

## 2023-10-11 LAB
ANION GAP SERPL CALC-SCNC: 11 MMOL/L — SIGNIFICANT CHANGE UP (ref 7–14)
BLD GP AB SCN SERPL QL: NEGATIVE — SIGNIFICANT CHANGE UP
BUN SERPL-MCNC: 26 MG/DL — HIGH (ref 7–23)
CALCIUM SERPL-MCNC: 9.9 MG/DL — SIGNIFICANT CHANGE UP (ref 8.4–10.5)
CHLORIDE SERPL-SCNC: 101 MMOL/L — SIGNIFICANT CHANGE UP (ref 98–107)
CO2 SERPL-SCNC: 24 MMOL/L — SIGNIFICANT CHANGE UP (ref 22–31)
CREAT SERPL-MCNC: 0.94 MG/DL — SIGNIFICANT CHANGE UP (ref 0.5–1.3)
EGFR: 104 ML/MIN/1.73M2 — SIGNIFICANT CHANGE UP
GLUCOSE SERPL-MCNC: 76 MG/DL — SIGNIFICANT CHANGE UP (ref 70–99)
HCT VFR BLD CALC: 40.7 % — SIGNIFICANT CHANGE UP (ref 39–50)
HGB BLD-MCNC: 13.7 G/DL — SIGNIFICANT CHANGE UP (ref 13–17)
MCHC RBC-ENTMCNC: 27.1 PG — SIGNIFICANT CHANGE UP (ref 27–34)
MCHC RBC-ENTMCNC: 33.7 GM/DL — SIGNIFICANT CHANGE UP (ref 32–36)
MCV RBC AUTO: 80.6 FL — SIGNIFICANT CHANGE UP (ref 80–100)
NRBC # BLD: 0 /100 WBCS — SIGNIFICANT CHANGE UP (ref 0–0)
NRBC # FLD: 0 K/UL — SIGNIFICANT CHANGE UP (ref 0–0)
PLATELET # BLD AUTO: 289 K/UL — SIGNIFICANT CHANGE UP (ref 150–400)
POTASSIUM SERPL-MCNC: 4.6 MMOL/L — SIGNIFICANT CHANGE UP (ref 3.5–5.3)
POTASSIUM SERPL-SCNC: 4.6 MMOL/L — SIGNIFICANT CHANGE UP (ref 3.5–5.3)
RBC # BLD: 5.05 M/UL — SIGNIFICANT CHANGE UP (ref 4.2–5.8)
RBC # FLD: 13.9 % — SIGNIFICANT CHANGE UP (ref 10.3–14.5)
RH IG SCN BLD-IMP: POSITIVE — SIGNIFICANT CHANGE UP
SODIUM SERPL-SCNC: 136 MMOL/L — SIGNIFICANT CHANGE UP (ref 135–145)
WBC # BLD: 6.51 K/UL — SIGNIFICANT CHANGE UP (ref 3.8–10.5)
WBC # FLD AUTO: 6.51 K/UL — SIGNIFICANT CHANGE UP (ref 3.8–10.5)

## 2023-10-11 RX ORDER — SODIUM CHLORIDE 0.65 %
2 AEROSOL, SPRAY (ML) NASAL
Qty: 0 | Refills: 0 | DISCHARGE

## 2023-10-11 RX ORDER — OXYCODONE HYDROCHLORIDE 5 MG/1
5 TABLET ORAL
Qty: 0 | Refills: 0 | DISCHARGE

## 2023-10-11 RX ORDER — IBUPROFEN 200 MG
30 TABLET ORAL
Qty: 0 | Refills: 0 | DISCHARGE

## 2023-10-11 RX ORDER — SODIUM CHLORIDE 9 MG/ML
1000 INJECTION, SOLUTION INTRAVENOUS
Refills: 0 | Status: DISCONTINUED | OUTPATIENT
Start: 2023-10-19 | End: 2023-11-02

## 2023-10-11 RX ORDER — CHLORHEXIDINE GLUCONATE 213 G/1000ML
15 SOLUTION TOPICAL
Qty: 0 | Refills: 0 | DISCHARGE

## 2023-10-11 RX ORDER — FLUTICASONE PROPIONATE 50 MCG
1 SPRAY, SUSPENSION NASAL
Qty: 0 | Refills: 0 | DISCHARGE

## 2023-10-11 RX ORDER — ACETAMINOPHEN 500 MG
20 TABLET ORAL
Qty: 0 | Refills: 0 | DISCHARGE

## 2023-10-11 RX ORDER — OXYMETAZOLINE HYDROCHLORIDE 0.5 MG/ML
2 SPRAY NASAL
Qty: 0 | Refills: 0 | DISCHARGE
End: 2023-05-27

## 2023-10-11 NOTE — H&P PST ADULT - HISTORY OF PRESENT ILLNESS
42 year old male presents to PST with pre op dx of, maxillary hypoplasia and inflammed oral prosthesis, for pre op evaluation prior to Removal of infected hardware from maxilla, debridement left maxilla with Dr Duffy.

## 2023-10-11 NOTE — H&P PST ADULT - PROBLEM SELECTOR PLAN 2
Patient reports that with his last surgery- on 05/24/23- LEFORT- with difficult nasal intubation.?" problem putting tube in nose, kept splitting and wound up, with very raw throat" patient reports of  post operative nose bleeds. Patient reports that with his last surgery- on 05/24/23- LEFORT- with difficult nasal intubation.?" problem putting tube in nose, kept splitting and wound up, with very raw throat" patient reports of  post operative nose bleeds.    FROM of neck  complete visualization of uvula- class 2- Mallampati

## 2023-10-11 NOTE — H&P PST ADULT - ASSESSMENT
42 year old male presents to PST with pre op dx of, maxillary hypoplasia and inflammed oral prosthesis,for pre op evaluation prior to Removal of infected hardware from maxilla, debridement left maxilla with Dr Duffy.

## 2023-10-11 NOTE — H&P PST ADULT - ANESTHESIA, PREVIOUS REACTION, PROFILE
problem putting tube in nose, kept splitting and wound up, with very raw throat" reports anesthesiologist  told him it was difficult intubation

## 2023-10-11 NOTE — H&P PST ADULT - ENMT COMMENTS
Patient reports  of recent LEFORT surgery 05/24/23- with inflamed  oral prosthesis- plan to remove infected hardware Pre op diagnosis- inflamed maxilla hardware

## 2023-10-11 NOTE — H&P PST ADULT - PROBLEM SELECTOR PLAN 1
Patient is tentatively scheduled for Removal of infected hardware from maxilla, debridement left maxilla with Dr Duffy on 10/19/23.    Pre-op instructions provided. Pt given verbal and written instructions with teach back on pepcid. Pt verbalized understanding with return demonstration.    CBC, BMP, T&S sent

## 2023-10-11 NOTE — H&P PST ADULT - NSICDXPASTSURGICALHX_GEN_ALL_CORE_FT
PAST SURGICAL HISTORY:  H/O arthroscopy of knee     History of arthroscopic surgery of shoulder     History of mandibular surgery

## 2023-10-11 NOTE — H&P PST ADULT - MUSCULOSKELETAL
details… ROM intact/no calf tenderness/normal gait/strength 5/5 bilateral upper extremities/strength 5/5 bilateral lower extremities/extremities exam

## 2023-10-11 NOTE — H&P PST ADULT - ADDITIONAL PE
Denies dentures. Denies loose teeth. patient with dental braces  complete visualization of uvula- class 2- mallampati

## 2023-10-18 ENCOUNTER — TRANSCRIPTION ENCOUNTER (OUTPATIENT)
Age: 43
End: 2023-10-18

## 2023-10-19 ENCOUNTER — OUTPATIENT (OUTPATIENT)
Dept: OUTPATIENT SERVICES | Facility: HOSPITAL | Age: 43
LOS: 1 days | Discharge: HOME | End: 2023-10-19

## 2023-10-19 ENCOUNTER — TRANSCRIPTION ENCOUNTER (OUTPATIENT)
Age: 43
End: 2023-10-19

## 2023-10-19 VITALS
RESPIRATION RATE: 15 BRPM | HEART RATE: 67 BPM | SYSTOLIC BLOOD PRESSURE: 122 MMHG | HEIGHT: 76 IN | DIASTOLIC BLOOD PRESSURE: 85 MMHG | WEIGHT: 220.02 LBS | TEMPERATURE: 98 F

## 2023-10-19 VITALS
SYSTOLIC BLOOD PRESSURE: 120 MMHG | HEART RATE: 85 BPM | DIASTOLIC BLOOD PRESSURE: 71 MMHG | RESPIRATION RATE: 16 BRPM | OXYGEN SATURATION: 96 %

## 2023-10-19 DIAGNOSIS — Z98.890 OTHER SPECIFIED POSTPROCEDURAL STATES: Chronic | ICD-10-CM

## 2023-10-19 DIAGNOSIS — M26.03 MANDIBULAR HYPERPLASIA: ICD-10-CM

## 2023-10-19 RX ORDER — CHLORHEXIDINE GLUCONATE 213 G/1000ML
15 SOLUTION TOPICAL
Qty: 1 | Refills: 0
Start: 2023-10-19 | End: 2023-10-25

## 2023-10-19 RX ORDER — FLUOXETINE HCL 10 MG
1 CAPSULE ORAL
Refills: 0 | DISCHARGE

## 2023-10-19 RX ORDER — HYDROMORPHONE HYDROCHLORIDE 2 MG/ML
0.25 INJECTION INTRAMUSCULAR; INTRAVENOUS; SUBCUTANEOUS
Refills: 0 | Status: DISCONTINUED | OUTPATIENT
Start: 2023-10-19 | End: 2023-10-19

## 2023-10-19 RX ORDER — IBUPROFEN 200 MG
1 TABLET ORAL
Qty: 15 | Refills: 0
Start: 2023-10-19 | End: 2023-10-23

## 2023-10-19 RX ORDER — ONDANSETRON 8 MG/1
4 TABLET, FILM COATED ORAL ONCE
Refills: 0 | Status: DISCONTINUED | OUTPATIENT
Start: 2023-10-19 | End: 2023-11-02

## 2023-10-19 RX ORDER — ACETAMINOPHEN 500 MG
1 TABLET ORAL
Qty: 15 | Refills: 0
Start: 2023-10-19 | End: 2023-10-23

## 2023-10-19 RX ORDER — HYDROMORPHONE HYDROCHLORIDE 2 MG/ML
0.5 INJECTION INTRAMUSCULAR; INTRAVENOUS; SUBCUTANEOUS
Refills: 0 | Status: DISCONTINUED | OUTPATIENT
Start: 2023-10-19 | End: 2023-10-19

## 2023-10-19 NOTE — ASU DISCHARGE PLAN (ADULT/PEDIATRIC) - NS MD DC FALL RISK RISK
For information on Fall & Injury Prevention, visit: [email or web address] OR  [email or web address] OR  https://www.cdc.gov/steadi/patient.html

## 2023-10-19 NOTE — H&P ADULT - HISTORY OF PRESENT ILLNESS
42 year old male presents with pre op dx of, maxillary hypoplasia and inflammed oral prosthesis, here today for Removal of infected hardware from maxilla and debridement of left maxilla with Dr Duffy.

## 2023-10-19 NOTE — ASU DISCHARGE PLAN (ADULT/PEDIATRIC) - FOLLOW UP APPOINTMENTS
may also call Recovery Room (PACU) 24/7 @ [phone] or 911/[facility]

## 2023-10-19 NOTE — H&P ADULT - NSHPREVIEWOFSYSTEMS_GEN_ALL_CORE
Review of Systems:   · General	negative  · Skin/Breast	negative  · Ophthalmologic	negative  · Negative ENMT Symptoms	no hearing difficulty; no ear pain; no tinnitus; no vertigo; no sinus symptoms; no nasal congestion  · ENMT Symptoms	mild gum discharge  · ENMT Comments	Patient reports  recent LEFORT surgery 05/24/23- with inflamed  oral prosthesis- plan to remove infected hardware  · Negative Respiratory and Thorax Symptoms	no wheezing; no dyspnea; no cough  · Negative Cardiovascular Symptoms	no chest pain; no palpitations; no dyspnea on exertion  · Negative Gastrointestinal Symptoms	no nausea; no vomiting; no diarrhea; no constipation  · Genitourinary	negative  · Negative Musculoskeletal Symptoms	no arthralgia  · Musculoskeletal Symptoms	arthritis; joint pain  · Musculoskeletal Comments	reports of B/L knee pain  · Neurological	negative  · Negative Psychiatric Symptoms	no suicidal ideation; no depression; no anxiety  · Psychiatric Symptoms	anxiety; on meds with relief  · Hematology/Lymphatics	negative  · Negative Endocrine Symptoms	no cold intolerance; no heat intolerance; no striae  · Allergic/Immunologic	negative

## 2023-10-19 NOTE — ASU DISCHARGE PLAN (ADULT/PEDIATRIC) - PAIN MANAGEMENT
Prescriptions electronically submitted to pharmacy from [facility] You were given 1000mg IV Tylenol for pain management.  Please DO NOT take any Tylenol containing products, such as  Vicodin, Percocet, Excedrin, many cold preparations for the next 6 hours (until  2:45pm_____).  DO NOT EXCEED 3000MG OF TYLENOL OVER 24 HOURS./Prescriptions electronically submitted to pharmacy from [facility]

## 2023-10-19 NOTE — ASU DISCHARGE PLAN (ADULT/PEDIATRIC) - CARE PROVIDER_API CALL
Leandro Duffy  Oral/Maxillofacial Surgery  [address]  [place] [address]  Phone: [phone]  Fax: [phone]  Follow Up Time:

## 2023-10-19 NOTE — H&P ADULT - NSHPPHYSICALEXAM_GEN_ALL_CORE
Physical Exam:  · Constitutional	well-groomed; no distress  · Eyes	PERRL; conjunctiva clear  · ENMT	neck  · Neck	supple  · ENMT Comments	Pre op diagnosis- inflamed maxilla hardware  · Respiratory	clear to auscultation bilaterally; no wheezes; no respiratory distress  · Cardiovascular	regular rate and rhythm; S1 S2 present; no JVD; vascular  · Radial Pulse	right normal; left normal  · Gastrointestinal	soft; nontender  · Genitourinary Male	not examined  · Neurological	sensation intact; responds to verbal commands  · Mental Status	alert x3  · Gait/Balance	steady  · Skin	warm and dry; color normal  · Lymphatics Comments	No anterior cervical lymphadenopathy  · Musculoskeletal	normal gait; ROM intact; strength 5/5 bilateral upper extremities; strength 5/5 bilateral lower extremities; no calf tenderness; extremities exam  · Extremities Exam	no clubbing; no cyanosis  · Psychiatric	normal affect; alert and oriented x3  · Additional PE	Denies dentures. Denies loose teeth. patient with dental braces  complete visualization of uvula- class 2- mallampati

## 2024-11-30 NOTE — H&P ADULT - CLICK TO LAUNCH ORM
Patient is noted to desaturate with ambulation, endorsing lightheadedness. States that this has been worsening over the last two weeks   Oxygen saturation dropped to 82% with HR at 104 bpm  Previous echocardiogram from 8/26/24 reviewed: Left Ventricle: Left ventricular cavity size is normal. Wall thickness is mildly increased. The left ventricular ejection fraction is 46% by biplane measurement. Systolic function is mildly reduced. Diastolic function is abnormal. 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 The thrombus is 1.5 x 2.9 cm in its largest visualized dimension and does not appear mobile  Telemetry monitoring    echo pending  PT/OT eval    .

## 2025-05-07 NOTE — H&P ADULT - ASSESSMENT
42 year old male presents with pre op dx of, maxillary hypoplasia and inflamed oral prosthesis, here today for Removal of infected hardware from maxilla and debridement of left maxilla with Dr Duffy.    Plan:  -OR today for removal of infected hardware and debridement of L maxilla       Oral and Maxillofacial Surgery  [facility] Pager [phone]  <-- Click to add NO significant Past Surgical History

## (undated) DEVICE — SUT CHROMIC 3-0 27" RB-1

## (undated) DEVICE — DRAPE 3/4 SHEET 52X76"

## (undated) DEVICE — BUR LINVATEC CARBIDE SIDECUTTING 0.8MM 4.9MM

## (undated) DEVICE — CANISTER DISPOSABLE THIN WALL 3000CC

## (undated) DEVICE — LABELS BLANK W PEN

## (undated) DEVICE — WARMING BLANKET LOWER ADULT

## (undated) DEVICE — VENODYNE/SCD SLEEVE CALF MEDIUM

## (undated) DEVICE — DRAPE TOWEL BLUE 17" X 24"

## (undated) DEVICE — ELCTR GROUNDING PAD ADULT COVIDIEN

## (undated) DEVICE — PACK DENTAL

## (undated) DEVICE — NDL HYPO REGULAR BEVEL 25G X 1.5" (BLUE)

## (undated) DEVICE — STRYKER BONE MODEL CLEARVIEW MAXILLA

## (undated) DEVICE — SYR LUER LOK 10CC

## (undated) DEVICE — DRAPE SURGICAL #1010

## (undated) DEVICE — DRILL BIT STRYKER CRANIOMAXILLOFACIAL 1.4X8MM

## (undated) DEVICE — SUT VICRYL 4-0 27" RB-1 UNDYED

## (undated) DEVICE — PREP BETADINE SPONGE STICKS

## (undated) DEVICE — POSITIONER FOAM EGG CRATE ULNAR 2PCS (PINK)

## (undated) DEVICE — BUR LINVATEC OVAL MEDIUM 4MM CARBIDE

## (undated) DEVICE — STAPLER SKIN VISI-STAT 35 WIDE

## (undated) DEVICE — STRYKER BONE MODEL CLEARVIEW MANDIBLE AND MAXILLA

## (undated) DEVICE — FOLEY TRAY 14FR 5CC LF UMETER CLOSED

## (undated) DEVICE — Device

## (undated) DEVICE — PROTECTOR HEEL / ELBOW

## (undated) DEVICE — BRA JAW

## (undated) DEVICE — PACK DENTAL MINOR

## (undated) DEVICE — SUT VICRYL 2-0 27" SH UNDYED

## (undated) DEVICE — SAW BLADE STRYKER RECIPROCATING 22.5MMX0.38MM

## (undated) DEVICE — DRAPE INSTRUMENT POUCH 6.75" X 11"

## (undated) DEVICE — DRAPE MAYO STAND 23"

## (undated) DEVICE — PREP BETADINE KIT

## (undated) DEVICE — SUT CHROMIC 4-0 27" RB-1

## (undated) DEVICE — SYR LUER LOK 3CC